# Patient Record
Sex: MALE | Race: WHITE | Employment: OTHER | URBAN - NONMETROPOLITAN AREA
[De-identification: names, ages, dates, MRNs, and addresses within clinical notes are randomized per-mention and may not be internally consistent; named-entity substitution may affect disease eponyms.]

---

## 2020-09-30 ENCOUNTER — OUTSIDE SERVICES (OUTPATIENT)
Dept: FAMILY MEDICINE CLINIC | Age: 80
End: 2020-09-30
Payer: MEDICARE

## 2020-09-30 VITALS
TEMPERATURE: 98.9 F | HEART RATE: 64 BPM | OXYGEN SATURATION: 97 % | SYSTOLIC BLOOD PRESSURE: 106 MMHG | RESPIRATION RATE: 16 BRPM | WEIGHT: 185.1 LBS | DIASTOLIC BLOOD PRESSURE: 62 MMHG

## 2020-09-30 PROBLEM — D50.0 IRON DEFICIENCY ANEMIA DUE TO CHRONIC BLOOD LOSS: Status: ACTIVE | Noted: 2020-09-30

## 2020-09-30 PROBLEM — F32.A DEPRESSION: Status: ACTIVE | Noted: 2020-09-30

## 2020-09-30 PROBLEM — I10 ESSENTIAL HYPERTENSION: Status: ACTIVE | Noted: 2020-09-30

## 2020-09-30 PROBLEM — I50.9 CHRONIC CONGESTIVE HEART FAILURE (HCC): Status: ACTIVE | Noted: 2020-09-30

## 2020-09-30 PROBLEM — J44.9 CHRONIC OBSTRUCTIVE PULMONARY DISEASE (HCC): Status: ACTIVE | Noted: 2020-09-30

## 2020-09-30 PROBLEM — I48.19 PERSISTENT ATRIAL FIBRILLATION (HCC): Status: ACTIVE | Noted: 2020-09-30

## 2020-09-30 PROBLEM — K31.6 DUODENAL FISTULA: Status: ACTIVE | Noted: 2020-09-30

## 2020-09-30 PROCEDURE — 99309 SBSQ NF CARE MODERATE MDM 30: CPT | Performed by: NURSE PRACTITIONER

## 2020-09-30 PROCEDURE — 99356 PR PROLONGED SVC I/P OR OBS SETTING 1ST HOUR: CPT | Performed by: NURSE PRACTITIONER

## 2020-09-30 ASSESSMENT — ENCOUNTER SYMPTOMS
ABDOMINAL DISTENTION: 0
SORE THROAT: 0
ABDOMINAL PAIN: 1
SHORTNESS OF BREATH: 0
COUGH: 0
DIARRHEA: 0
EYE REDNESS: 0
NAUSEA: 0
CONSTIPATION: 0
BACK PAIN: 1
WHEEZING: 0
VOMITING: 0
RHINORRHEA: 0

## 2020-09-30 NOTE — PROGRESS NOTES
Renay Cleveland is a 78 y.o. male who presents today for his medical conditions/complaints as noted below. Chief Complaint   Patient presents with    Other     pain           HPI:     Sasha Hare is seen today for pain all over. He was admitted to the facility on 9/29/20 from [facility]. He was admitted to [place] for therapy needs for deconditioning post exploratory lap with total gastrectomy complicated by duodenal stump leak evidenced by bilious drainage from JAYMIE drain. His procedure was at [facility] over 1 month ago. He had a 2 night interruption to his stay at [place] and was transferred to inpatient for anemia and bleeding from JAYMIE drain. He did receive 2 units of packed RBCs and was transferred back up to the TCU for therapy needs. He is currently on TPN nocturnally and has chronic pain in the abdomen he does take Percocet every 6 hours for this. He has received 1 dose of Percocet daily since admission will encourage nursing to continue to get this every 6 hours or at least offer. He has a past medical history of MRSA, lumbar stenosis, GERD, falls frequently, hypertension, COPD, heart failure, cellulitis, angina, bleeding disorder, arthritis, atrial fib, history of pacemaker. He is alert and oriented this morning and states he is very weak. He does know this provider today and is able to state my maiden name. He is pleasant and states he is somewhat sad but wants to get stronger and return home to wife. Spoke with daughter who states he was on Coumadin prior for atrial fib.        Past Medical History:   Diagnosis Date    Anemia     Angina pectoris (HCC)     Atrial fibrillation (HCC)     CHF (congestive heart failure) (HCC)     COPD (chronic obstructive pulmonary disease) (HCC)     Gastric ulcer     GERD (gastroesophageal reflux disease)     HTN (hypertension)     Lumbar stenosis     MRSA (methicillin resistant staph aureus) culture positive       Past Surgical History:   Procedure Laterality Date    ARTERIAL BYPASS SURGRY      CHOLECYSTECTOMY      GASTRECTOMY      GASTRIC BYPASS SURGERY      IR INS PICC VAD W SQ PORT GREATER THAN 5      PACEMAKER INSERTION      SPINAL CORD STIMULATOR SURGERY         Family History   Problem Relation Age of Onset    Cancer Other        Social History     Tobacco Use    Smoking status: Former Smoker    Smokeless tobacco: Never Used   Substance Use Topics    Alcohol use: Not Currently      No Known Allergies    Health Maintenance   Topic Date Due    Flu vaccine (1) 09/01/2020       Subjective:      Review of Systems   Constitutional: Positive for fatigue and unexpected weight change. Negative for chills and fever. HENT: Positive for hearing loss. Negative for congestion, rhinorrhea and sore throat. Eyes: Positive for visual disturbance. Negative for redness. Respiratory: Negative for cough, shortness of breath and wheezing. Cardiovascular: Negative for chest pain and leg swelling. Gastrointestinal: Positive for abdominal pain. Negative for abdominal distention, constipation, diarrhea, nausea and vomiting. Endocrine: Negative for polydipsia and polyuria. Genitourinary: Negative for dysuria, frequency and hematuria. Musculoskeletal: Positive for back pain and gait problem. Negative for arthralgias and myalgias. Skin: Positive for pallor and wound. Negative for rash. Allergic/Immunologic: Positive for immunocompromised state. Negative for environmental allergies. Neurological: Positive for weakness. Negative for dizziness, light-headedness and headaches. Hematological: Bruises/bleeds easily. Psychiatric/Behavioral: Positive for confusion and sleep disturbance. Negative for dysphoric mood. The patient is not nervous/anxious. Objective:     Physical Exam  Vitals signs and nursing note reviewed. Constitutional:       General: He is not in acute distress. Appearance: He is well-developed. He is ill-appearing.    HENT:      Head: /62   Pulse 64   Temp 98.9 °F (37.2 °C)   Resp 16   Wt 185 lb 1.6 oz (84 kg)   SpO2 97%     Assessment/Plan      1. Duodenal fistula - recent gastrectomy. Continue Octreocide and TPN in place. Encourage small meals. Supplements in place. Monitor drainage in JAYMIE drain. Update surgeon. Remains on Lovenox daily. Will continue and recheck labs per pharmacy for TPN. Percocet for pain. Nursing to check on pain and offer. Continue Pantoprazole. 2. Iron deficiency anemia due to chronic blood loss - received 2 units of PRBC's secondary to chronic blood loss anemia. Follows with hematology. Coumadin on hold, but is on Lovenox. To follow up with cardio and surgery when this can be resumed. Will repeat CBC. 3. Essential hypertension - Blood pressures stable. Continue to monitor blood pressures. No current blood pressure meds. 4. Persistent atrial fibrillation (HCC) - rate controlled. Nursing to update cardio and update on recent hospitalizations. Continue Lovenox. 5. Chronic congestive heart failure, unspecified heart failure type (HCC) - No edema. Denies shortness of air. Continue to monitor lung sounds and weight. Labs in place to monitor TPN. 6. Chronic obstructive pulmonary disease, unspecified COPD type ([facility] [address] - Chronic and previous smoker. Continue Fluticosone and Albuterol. 7. Depression- Chronic and continue Buspar and Duloxetine. I certify I spent 58 minutes reviewing outside records, speaking with hospital staff, discussing care with family and establishing care. Patient seen and examined. History partially obtained by chart review and nursing notes. I have reviewed patient's past medical, surgical, social, and family history and have made updates where appropriate.   See facility EMR for updated medication list.       Electronically signed by ALEJANDRA Pineda CNP on 9/30/2020 at 5:47 PM

## 2020-10-01 ENCOUNTER — OUTSIDE SERVICES (OUTPATIENT)
Dept: FAMILY MEDICINE CLINIC | Age: 80
End: 2020-10-01
Payer: MEDICARE

## 2020-10-01 VITALS
DIASTOLIC BLOOD PRESSURE: 61 MMHG | WEIGHT: 185.1 LBS | OXYGEN SATURATION: 98 % | HEART RATE: 68 BPM | SYSTOLIC BLOOD PRESSURE: 100 MMHG | TEMPERATURE: 98.2 F | RESPIRATION RATE: 16 BRPM

## 2020-10-01 PROBLEM — N40.0 BENIGN PROSTATIC HYPERPLASIA: Status: ACTIVE | Noted: 2020-10-01

## 2020-10-01 PROCEDURE — 99490 CHRNC CARE MGMT STAFF 1ST 20: CPT | Performed by: FAMILY MEDICINE

## 2020-10-01 PROCEDURE — 99306 1ST NF CARE HIGH MDM 50: CPT | Performed by: FAMILY MEDICINE

## 2020-10-01 RX ORDER — OXYCODONE AND ACETAMINOPHEN 7.5; 325 MG/1; MG/1
1 TABLET ORAL EVERY 6 HOURS PRN
Qty: 112 TABLET | Refills: 0 | Status: SHIPPED | OUTPATIENT
Start: 2020-10-01 | End: 2020-10-31

## 2020-10-01 ASSESSMENT — ENCOUNTER SYMPTOMS
SHORTNESS OF BREATH: 0
WHEEZING: 0
VOMITING: 0
CONSTIPATION: 0
EYE REDNESS: 0
ABDOMINAL DISTENTION: 0
ABDOMINAL PAIN: 1
DIARRHEA: 0
SORE THROAT: 0
NAUSEA: 0
RHINORRHEA: 0
COUGH: 0

## 2020-10-01 NOTE — PROGRESS NOTES
stenosis     MRSA (methicillin resistant staph aureus) culture positive        Past Surgical History:   Procedure Laterality Date    ARTERIAL BYPASS SURGRY      CHOLECYSTECTOMY      GASTRECTOMY      GASTRIC BYPASS SURGERY      IR INS PICC VAD W SQ PORT GREATER THAN 5      PACEMAKER INSERTION      SPINAL CORD STIMULATOR SURGERY         Social History     Tobacco Use    Smoking status: Former Smoker    Smokeless tobacco: Never Used   Substance Use Topics    Alcohol use: Not Currently    Drug use: Not on file       Family History   Problem Relation Age of Onset    Cancer Other          Review of Systems   Constitutional: Positive for activity change, appetite change and fatigue. Negative for chills, fever and unexpected weight change. HENT: Negative for congestion, hearing loss, rhinorrhea and sore throat. Eyes: Negative for redness and visual disturbance. Respiratory: Negative for cough, shortness of breath and wheezing. Cardiovascular: Negative for chest pain and leg swelling. Gastrointestinal: Positive for abdominal pain. Negative for abdominal distention, constipation, diarrhea, nausea and vomiting. Endocrine: Negative for polydipsia and polyuria. Genitourinary: Negative for dysuria, frequency and hematuria. Musculoskeletal: Positive for arthralgias and gait problem. Negative for myalgias. Skin: Positive for pallor. Negative for rash and wound. Allergic/Immunologic: Positive for immunocompromised state. Negative for environmental allergies. Neurological: Positive for weakness. Negative for dizziness, light-headedness and headaches. Hematological: Bruises/bleeds easily. Psychiatric/Behavioral: Negative for dysphoric mood and sleep disturbance. The patient is not nervous/anxious. PHYSICAL EXAM:  Vitals:    10/01/20 1456   BP: 100/61   Pulse: 68   Resp: 16   Temp: 98.2 °F (36.8 °C)   SpO2: 98%        Physical Exam  Vitals signs and nursing note reviewed.  Exam conducted with a chaperone present. Constitutional:       General: He is not in acute distress. Appearance: Normal appearance. He is normal weight. He is ill-appearing (chronically). He is not diaphoretic. HENT:      Head: Normocephalic and atraumatic. Right Ear: Hearing normal.      Left Ear: Hearing normal.      Nose: Nose normal. No rhinorrhea. Mouth/Throat:      Lips: Pink. No lesions. Eyes:      General: Lids are normal. No scleral icterus. Neck:      Musculoskeletal: Normal range of motion and neck supple. Pulmonary:      Effort: Pulmonary effort is normal. No accessory muscle usage, respiratory distress or retractions. Abdominal:      General: Abdomen is flat. There is no distension. Tenderness: There is generalized abdominal tenderness. Musculoskeletal:      Right lower leg: No edema. Left lower leg: No edema. Skin:     Coloration: Skin is not pale. Findings: No bruising or rash. Neurological:      General: No focal deficit present. Mental Status: He is alert and oriented to person, place, and time. Psychiatric:         Attention and Perception: Attention normal.         Mood and Affect: Mood normal.         Speech: Speech normal.         Behavior: Behavior normal.         Cognition and Memory: Cognition and memory normal.          ASSESSMENT & PLAN  Berna Carlos was seen today for other. Diagnoses and all orders for this visit:    Duodenal fistula  Continue Octrocide, TPN and Pantoprazole. TPN formula to be formulated and monitored by pharmacy. Labs per pharmacy. Continue Lovenox until reviewed by cardio and surgeon. Will increase Percocet to 7.5 mg every 6 hours. Iron deficiency anemia due to chronic blood loss  Follows with hematology. Will follow labs. Essential hypertension  Blood pressures lower, but stable. No current meds. Continue to monitor. Persistent atrial fibrillation ([address]  Nursing to update cardio on admission and medications.  Will await update. Chronic congestive heart failure, unspecified heart failure type ([facility])  No edema. Lab and weight monitoring. Denies shortness of breath. Chronic obstructive pulmonary disease, unspecified COPD type ([facility] [address]  Denies shortness of breath. Continue Fluticosone and Albuterol. Other depression  Continue Buspar and Duloxetine. Benign prostatic hyperplasia, unspecified whether lower urinary tract symptoms present  Chronic and continue Flomax. No follow-ups on file. Resident has COPD, CHF, Afib, duodenal fistula, anemia  and it is expected to last 12 or more months. These chronic conditions place resident at a significant risk of death, acute exacerbation, decline in function or functional decline. The patient's comprehensive plan was monitored today. I spent 20 minutes reviewing plan. I have seen and examined the patient virtually and chaperone was present. The history was obtained through the patient, past medical records, and the staff. The patient's chart was updated as appropriate. Please see facility EMR for complete medication reconciliation. Pursuant to the emergency declaration under the [facility], [facility] waiver authority and the [employer] Act, this Virtual  Visit was conducted, with patient's consent, to reduce the patient's risk of exposure to COVID-19 and provide continuity of care for an established patient. Services were provided through a video synchronous discussion virtually to substitute for in-person SNF visit.     Electronically signed by Tamia Patel MD on 10/1/2020 at 3:25 PM

## 2020-10-20 ENCOUNTER — OUTSIDE SERVICES (OUTPATIENT)
Dept: FAMILY MEDICINE CLINIC | Age: 80
End: 2020-10-20
Payer: MEDICARE

## 2020-10-20 PROCEDURE — 99490 CHRNC CARE MGMT STAFF 1ST 20: CPT | Performed by: PHYSICAL MEDICINE & REHABILITATION

## 2020-10-20 PROCEDURE — 99306 1ST NF CARE HIGH MDM 50: CPT | Performed by: PHYSICAL MEDICINE & REHABILITATION

## 2020-10-22 VITALS
BODY MASS INDEX: 27.27 KG/M2 | TEMPERATURE: 98.5 F | RESPIRATION RATE: 16 BRPM | HEART RATE: 71 BPM | HEIGHT: 72 IN | SYSTOLIC BLOOD PRESSURE: 124 MMHG | OXYGEN SATURATION: 96 % | DIASTOLIC BLOOD PRESSURE: 81 MMHG | WEIGHT: 201.3 LBS

## 2020-10-22 NOTE — PROGRESS NOTES
Mari Patel is a 78 y.o. male who presents today for his medical conditions/complaints as noted below. Chief Complaint   Patient presents with    Other     Admission visit     HPI:    I have reviewed the patient's past medical history, past surgical history, allergies,medications, social and family history and I have made updates where appropriate. The patient is a 79-year-old male admitted to the facility 10/16/2020 from [facility] where he had been admitted October 6, 2020 with sepsis secondary to Serratia (source of the infection being the tip of the PICC line), weakness, postoperative confusion, status post exploratory laparotomy, urinary tract infection secondary to E. coli, pneumonia, and retroperitoneal abscess. He had been originally admitted to the facility on 9/29/20 from [facility], and was subsequently admitted to [address] [place] [address] for therapy needs for deconditioning status post exploratory lap with total gastrectomy complicated by duodenal stump leak evidenced by bilious drainage from JAYMIE drain. His procedure was at [facility] over 1 month ago. He had a 2 night interruption to his stay at [address] [place] [address] and was transferred to inpatient for anemia and bleeding from JAYMIE drain. He did receive 2 units of packed RBCs and was transferred back up to the TCU for therapy needs. He is currently on TPN nocturnally and has chronic pain in the abdomen for which he uses Percocet 7.5/325 every 6 hours. He is participating in therapy and is not somnolent. He has a past medical history of MRSA, lumbar stenosis, GERD, frequent falls, hypertension, COPD, heart failure, cellulitis, angina, bleeding disorder, arthritis, atrial fib, history of pacemaker. He is alert and oriented today when seen per myself in his room. He denies chest pain and shortness of breath. He states he has not been sleeping well at night and is willing to try trazodone at bedtime.   His bowels have been moving    Past Medical History: Diagnosis Date    Anemia     Angina pectoris (HCC)     Atrial fibrillation (HCC)     CHF (congestive heart failure) (HCC)     COPD (chronic obstructive pulmonary disease) (HCC)     Gastric ulcer     GERD (gastroesophageal reflux disease)     HTN (hypertension)     Lumbar stenosis     MRSA (methicillin resistant staph aureus) culture positive        Past Surgical History:   Procedure Laterality Date    ARTERIAL BYPASS SURGRY      CHOLECYSTECTOMY      GASTRECTOMY      GASTRIC BYPASS SURGERY      IR INS PICC VAD W SQ PORT GREATER THAN 5      PACEMAKER INSERTION      SPINAL CORD STIMULATOR SURGERY         Social History     Tobacco Use    Smoking status: Former Smoker    Smokeless tobacco: Never Used   Substance Use Topics    Alcohol use: Not Currently    Drug use: Not on file       Family History   Problem Relation Age of Onset    Cancer Other          Review of Systems   Constitutional: Positive for activity change. Negative for chills, fatigue and fever. He has been tolerating therapies   HENT: Negative for congestion, drooling, ear discharge, facial swelling, rhinorrhea and sore throat. Eyes: Negative for photophobia, pain, discharge, redness, itching and visual disturbance. Respiratory: Negative for cough, choking, shortness of breath and wheezing. Cardiovascular: Negative for chest pain and leg swelling. Gastrointestinal: Negative for abdominal distention, constipation, diarrhea, nausea and vomiting. Endocrine: Negative for polydipsia, polyphagia and polyuria. Genitourinary: Negative for dysuria, frequency, hematuria and urgency. Musculoskeletal: Negative for arthralgias, myalgias and neck pain. Skin: Negative for pallor, rash and wound. Allergic/Immunologic: Negative for environmental allergies and immunocompromised state. Neurological: Negative for dizziness, facial asymmetry, speech difficulty, light-headedness and headaches.    Hematological: Does not bruise/bleed easily. Psychiatric/Behavioral: Positive for sleep disturbance. Negative for agitation, behavioral problems and dysphoric mood. The patient is not nervous/anxious. PHYSICAL EXAM:  Vitals:    10/22/20 1758   BP: 124/81   Pulse: 71   Resp: 16   Temp: 98.5 °F (36.9 °C)   SpO2: 96%        Physical Exam  Vitals signs and nursing note reviewed. Constitutional:       General: He is not in acute distress. Appearance: He is well-developed. He is not ill-appearing, toxic-appearing or diaphoretic. HENT:      Head: Normocephalic and atraumatic. Right Ear: External ear normal.      Left Ear: External ear normal.      Nose: Nose normal. No congestion or rhinorrhea. Mouth/Throat:      Pharynx: Oropharynx is clear. Eyes:      General: No scleral icterus. Right eye: No discharge. Left eye: No discharge. Extraocular Movements: Extraocular movements intact. Conjunctiva/sclera: Conjunctivae normal.      Pupils: Pupils are equal, round, and reactive to light. Neck:      Musculoskeletal: Normal range of motion and neck supple. Thyroid: No thyromegaly. Vascular: No JVD. Cardiovascular:      Rate and Rhythm: Normal rate and regular rhythm. Pulses: Normal pulses. Heart sounds: Normal heart sounds. Pulmonary:      Effort: Pulmonary effort is normal. No respiratory distress. Breath sounds: Normal breath sounds. No stridor. No wheezing, rhonchi or rales. Abdominal:      General: Bowel sounds are normal. There is no distension. Palpations: Abdomen is soft. Tenderness: There is no abdominal tenderness. Musculoskeletal: Normal range of motion. General: No tenderness or deformity. Right shoulder: He exhibits no tenderness, no bony tenderness and no pain. Left shoulder: He exhibits no tenderness, no bony tenderness and no pain. Cervical back: He exhibits no tenderness, no bony tenderness and no pain. Thoracic back: He exhibits no tenderness, no bony tenderness, no pain and no spasm. Lumbar back: He exhibits no tenderness, no bony tenderness and no pain. Lymphadenopathy:      Cervical: No cervical adenopathy. Upper Body:      Right upper body: No supraclavicular adenopathy. Left upper body: No supraclavicular adenopathy. Skin:     General: Skin is warm and dry. Coloration: Skin is not jaundiced or pale. Findings: No erythema or rash. Neurological:      Mental Status: He is alert and oriented to person, place, and time. Motor: No atrophy, abnormal muscle tone or seizure activity. Psychiatric:         Mood and Affect: Mood normal.         Speech: Speech normal.         Behavior: Behavior normal.         Thought Content: Thought content normal.         Judgment: Judgment normal.          ASSESSMENT & PLAN     1. Duodenal fistula  Continue Octrocide, TPN and Pantoprazole. TPN formula to be formulated and monitored by pharmacy. Labs per pharmacy. Continue Lovenox until reviewed by cardio and surgeon. Continue Percocet at 7.5 mg every 6 hours. 2.  Iron deficiency anemia due to chronic blood loss  Follows with hematology. Will follow labs. 3.  Essential hypertension  Blood pressures lower, but stable. No current meds. Continue to monitor. 4.  Persistent atrial fibrillation ([address]  Nursing to update cardio on admission and medications. 5.  Chronic congestive heart failure, unspecified heart failure type (HCC)  No edema. Lab and weight monitoring. Denies shortness of breath. 6.  Chronic obstructive pulmonary disease, unspecified COPD type ([address]  Denies shortness of breath. Continue Fluticosone and Albuterol. 7.  Other depression  Continue Buspar and Duloxetine. 8.  Benign prostatic hyperplasia, unspecified whether lower urinary tract symptoms present  Chronic and continue Flomax. 9.  Insomnia--Will add trazodone 50 mg at bedtime  .   Resident has COPD, CHF, Afib, duodenal fistula, anemia and it is expected to last 12 or more months. These chronic conditions place resident at a significant risk of death, acute exacerbation, decline in function or functional decline. The patient's comprehensive plan was monitored today. I spent 20 minutes reviewing plan.     Electronically signed by Negar Ngo MD on 10/20/2020 at 6:00 PM

## 2020-11-25 ENCOUNTER — OUTSIDE SERVICES (OUTPATIENT)
Dept: FAMILY MEDICINE CLINIC | Age: 80
End: 2020-11-25
Payer: MEDICARE

## 2020-11-25 VITALS
BODY MASS INDEX: 25.23 KG/M2 | RESPIRATION RATE: 16 BRPM | TEMPERATURE: 98.2 F | WEIGHT: 186 LBS | OXYGEN SATURATION: 96 % | HEART RATE: 71 BPM | DIASTOLIC BLOOD PRESSURE: 59 MMHG | SYSTOLIC BLOOD PRESSURE: 101 MMHG

## 2020-11-25 PROCEDURE — 99490 CHRNC CARE MGMT STAFF 1ST 20: CPT | Performed by: NURSE PRACTITIONER

## 2020-11-25 PROCEDURE — 99309 SBSQ NF CARE MODERATE MDM 30: CPT | Performed by: NURSE PRACTITIONER

## 2020-11-25 NOTE — PROGRESS NOTES
tenderness and no pain. Left shoulder: He exhibits no tenderness, no bony tenderness and no pain. Cervical back: He exhibits no tenderness, no bony tenderness and no pain. Thoracic back: He exhibits no tenderness, no bony tenderness, no pain and no spasm. Lumbar back: He exhibits no tenderness, no bony tenderness and no pain. Lymphadenopathy:      Cervical: No cervical adenopathy. Upper Body:      Right upper body: No supraclavicular adenopathy. Left upper body: No supraclavicular adenopathy. Skin:     General: Skin is warm and dry. Findings: No erythema or rash. Neurological:      Mental Status: He is oriented to person, place, and time and easily aroused. Motor: Atrophy and abnormal muscle tone present. No seizure activity. Psychiatric:         Mood and Affect: Affect is labile. Speech: Speech is delayed. Behavior: Behavior normal. Behavior is cooperative. Cognition and Memory: Memory is impaired. BP (!) 101/59   Pulse 71   Temp 98.2 °F (36.8 °C)   Resp 16   Wt 186 lb (84.4 kg)   SpO2 96%   BMI 25.23 kg/m²     Assessment/Plan      1. Duodenal fistula - Follow up with surgeon as directed. Continue pain meds. TPN as directed. 2. Iron deficiency anemia due to chronic blood loss - Chronic and continue to follow labs. 3. Essential hypertension - Blood pressures stable. Continue to monitor. 4. Persistent atrial fibrillation (HCC) - rate controlled. Continue Lovenox. 5. Chronic congestive heart failure, unspecified heart failure type ([address] - weight down to baseline. No edema. Continue to monitor. 6. Chronic obstructive pulmonary disease, unspecified COPD type ([address] - Chronic, denies shortness of breath, but noted pneumonia. Post covid. Continue antibiotics and current breathing meds. 7. Other depression - mood labile, continue Trazodone and Duloxetine.     8. Benign prostatic hyperplasia, unspecified whether lower urinary tract symptoms present - catheter in place. Urine clear. Continue Flomax. 9. Insomnia, unspecified type - Sleeping well. Will reduce Trazodone to 25 mg. 10. Pneumonia of both lower lobes due to infectious organism - Continue Levaquin. Await Legionella     Resident has CHF, HTN, duodenal stump leak, BPH, COPD and it is expected to last 12 or more months. These chronic conditions place resident at a significant risk of death, acute exacerbation, or functional decline. The patient's comprehensive plan was monitored today. I spent 20 minutes reviewing plan. Patient seen and examined. History partially obtained by chart review and nursing notes. I have reviewed patient's past medical, surgical, social, and family history and have made updates where appropriate.   See facility EMR for updated medication list.       Electronically signed by ALEJANDRA Cosme CNP on 11/25/2020 at 1:37 PM

## 2020-11-30 RX ORDER — OXYCODONE AND ACETAMINOPHEN 7.5; 325 MG/1; MG/1
TABLET ORAL
Qty: 52 TABLET | Refills: 0 | Status: SHIPPED | OUTPATIENT
Start: 2020-11-30 | End: 2020-12-19 | Stop reason: SDUPTHER

## 2020-12-16 ENCOUNTER — OUTSIDE SERVICES (OUTPATIENT)
Dept: FAMILY MEDICINE CLINIC | Age: 80
End: 2020-12-16
Payer: MEDICARE

## 2020-12-16 PROCEDURE — 99309 SBSQ NF CARE MODERATE MDM 30: CPT | Performed by: PHYSICAL MEDICINE & REHABILITATION

## 2020-12-16 PROCEDURE — 99490 CHRNC CARE MGMT STAFF 1ST 20: CPT | Performed by: PHYSICAL MEDICINE & REHABILITATION

## 2020-12-18 VITALS
SYSTOLIC BLOOD PRESSURE: 140 MMHG | RESPIRATION RATE: 16 BRPM | TEMPERATURE: 98.5 F | WEIGHT: 182.8 LBS | HEART RATE: 97 BPM | BODY MASS INDEX: 24.79 KG/M2 | DIASTOLIC BLOOD PRESSURE: 73 MMHG | OXYGEN SATURATION: 93 %

## 2020-12-18 ASSESSMENT — ENCOUNTER SYMPTOMS
VOMITING: 0
CONSTIPATION: 0
NAUSEA: 0
STRIDOR: 0
DIARRHEA: 0
WHEEZING: 0
ABDOMINAL DISTENTION: 0
SHORTNESS OF BREATH: 0
SORE THROAT: 0
EYE REDNESS: 0
COUGH: 0
RHINORRHEA: 0

## 2020-12-18 NOTE — PROGRESS NOTES
Curry Mendoza is a 78 y.o. male that presents for Other (Monthly visit)      This visit was performed via synchronous telecommunication system. Patient is located in the SNF  I am located in my office at home. HPI:    I have reviewed the patient's past medical history, past surgical history, allergies,medications, social and family history and I have made updates where appropriate. Patient is a 51-year-old male seen today on virtual visit for his monthly evaluation of his chronic medical conditions. He is currently receiving palliative care. His weight was 185 pounds in September as compared to 182.8 pounds now. His diet includes TPN/mechanical soft. His intake is between 0 and 25% and his weight is currently 182.8 pounds. He takes health shakes, fortified putting and his TPN as dietary supplements. Had no falls in the past month. Current behaviors include refusing to eat. Medication changes include Percocet 5/325 1 p.o. every 8 hours and trazodone 25 mg at bedtime. He is refusing therapies. He has been getting labs Mondays and Thursdays. He eats currently for pleasure feedings. Is hoping to last through his birthday tomorrow and [place] next week. His son has brain cancer and his wife currently has Covid. In addition he has a granddaughter who is currently an inpatient in the psychiatric unit. His PICC line was discontinued as was inadvertently pulled out but he did not want it reinserted. It is expected that he will choose hospice care in the near future.     Past Medical History:   Diagnosis Date    Anemia     Angina pectoris (HCC)     Atrial fibrillation (HCC)     CHF (congestive heart failure) (HCC)     COPD (chronic obstructive pulmonary disease) (HCC)     Gastric ulcer     GERD (gastroesophageal reflux disease)     HTN (hypertension)     Lumbar stenosis     MRSA (methicillin resistant staph aureus) culture positive        Past Surgical History:   Procedure Laterality Date  ARTERIAL BYPASS SURGRY      CHOLECYSTECTOMY      GASTRECTOMY      GASTRIC BYPASS SURGERY      IR INS PICC VAD W SQ PORT GREATER THAN 5      PACEMAKER INSERTION      SPINAL CORD STIMULATOR SURGERY         Social History     Tobacco Use    Smoking status: Former Smoker    Smokeless tobacco: Never Used   Substance Use Topics    Alcohol use: Not Currently    Drug use: Not on file       Family History   Problem Relation Age of Onset    Cancer Other          Review of Systems   Constitutional: Positive for activity change, appetite change and fatigue. Negative for chills and fever. HENT: Negative for congestion, rhinorrhea and sore throat. Eyes: Negative for redness and visual disturbance. Respiratory: Negative for cough, shortness of breath, wheezing and stridor. Cardiovascular: Negative for chest pain and leg swelling. Gastrointestinal: Negative for abdominal distention, constipation, diarrhea, nausea and vomiting. Endocrine: Negative for polydipsia, polyphagia and polyuria. Genitourinary: Negative for dysuria, frequency and hematuria. Musculoskeletal: Negative for arthralgias and myalgias. Skin: Negative for rash and wound. Allergic/Immunologic: Negative for environmental allergies and immunocompromised state. Neurological: Positive for weakness. Negative for dizziness, light-headedness and headaches. Hematological: Does not bruise/bleed easily. Psychiatric/Behavioral: Negative for dysphoric mood and sleep disturbance. The patient is not nervous/anxious. PHYSICAL EXAM:  Vitals:    12/18/20 1555   BP: (!) 140/73   Pulse: 97   Resp: 16   Temp: 98.5 °F (36.9 °C)   SpO2: 93%        Physical Exam  Vitals signs and nursing note reviewed. Exam conducted with a chaperone present. Constitutional:       General: He is not in acute distress. Appearance: Normal appearance. He is not toxic-appearing. HENT:      Head: Normocephalic and atraumatic. Right Ear: External ear normal.      Left Ear: External ear normal.      Nose: Nose normal. No congestion or rhinorrhea. Mouth/Throat:      Mouth: Mucous membranes are dry. Eyes:      General:         Right eye: No discharge. Left eye: No discharge. Extraocular Movements: Extraocular movements intact. Conjunctiva/sclera: Conjunctivae normal.   Neck:      Musculoskeletal: Normal range of motion. Cardiovascular:      Rate and Rhythm: Normal rate. Pulses: Normal pulses. Pulmonary:      Effort: Pulmonary effort is normal. No respiratory distress. Abdominal:      General: Abdomen is flat. There is no distension. Skin:     Coloration: Skin is not jaundiced. Neurological:      Mental Status: He is alert. Mental status is at baseline. 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 Continue pain meds. TPN as directed. 2. Iron deficiency anemia due to chronic blood loss - Chronic and continue to follow labs. 3. Essential hypertension - Blood pressures stable. Continue to monitor. 4. Persistent atrial fibrillation (HCC) - rate controlled. Continue Palliative Care. 5. Chronic congestive heart failure, unspecified heart failure type ([address] - weight down to baseline. No edema. Continue to monitor. 6. Chronic obstructive pulmonary disease, unspecified COPD type ([address] - Chronic, denies shortness of breath, but noted pneumonia. Post covid. Continue breathing meds. 7. Other depression - mood labile, continue Trazodone and Duloxetine for comfort. 8. Benign prostatic hyperplasia, unspecified whether lower urinary tract symptoms present - catheter in place. Comfort meds only.   9. Insomnia, unspecified type - monitor Resident has CHF, HTN, duodenal stump leak, BPH, COPD and they are expected to last 12 or more months. These chronic conditions place resident at a significant risk of death, acute exacerbation, or functional decline. The patient's comprehensive plan was monitored today. I spent 20 minutes reviewing plan. I have seen and examined the patient virtually and chaperone was present. The history was obtained through the patient, past medical records, and the staff. The patient's chart was updated as appropriate. Please see facility EMR for complete medication reconciliation. Pursuant to the emergency declaration under the [facility] Pending sale to [facility] waiver authority and the [employer] Act, this Virtual  Visit was conducted, with patient's consent, to reduce the patient's risk of exposure to COVID-19 and provide continuity of care for an established patient. Services were provided through a video synchronous discussion virtually to substitute for in-person SNF visit.     Electronically signed by Nga Melo MD on 12/16/2020 at 4:33 PM

## 2020-12-19 RX ORDER — OXYCODONE AND ACETAMINOPHEN 7.5; 325 MG/1; MG/1
1 TABLET ORAL 4 TIMES DAILY
Qty: 120 TABLET | Refills: 0 | Status: SHIPPED | OUTPATIENT
Start: 2020-12-19 | End: 2021-04-18